# Patient Record
Sex: MALE | URBAN - METROPOLITAN AREA
[De-identification: names, ages, dates, MRNs, and addresses within clinical notes are randomized per-mention and may not be internally consistent; named-entity substitution may affect disease eponyms.]

---

## 2024-07-16 ENCOUNTER — NURSE TRIAGE (OUTPATIENT)
Dept: NURSING | Facility: CLINIC | Age: 19
End: 2024-07-16

## 2024-07-16 NOTE — TELEPHONE ENCOUNTER
Pt calling that he is interested in coming in for a STI/STD test.  Pt also states today he had dental appt and had xrays put into his mouth without plastic wrap.  Pt states now he has noticed a lot of bumps now on the tip of his tongue.  No swelling, no difficulty to swallow.  Pt intends to have tongue looked at tomorrow when [facility] is open.  Yomaira Quezada RN  Emergency Department Results Team    Reason for Disposition   All other mouth symptoms (Exceptions: dry mouth from not drinking enough liquids, chapped lips)    Additional Information   Negative: SEVERE difficulty breathing (e.g., struggling for each breath, speaks in single words, stridor)   Negative: Sounds like a life-threatening emergency to the triager   Negative: Injury to tooth or teeth   Negative: Injury to mouth   Negative: Canker sore suspected (i.e., aphthous ulcer) in the mouth   Negative: Cold sore suspected (i.e., fever blister sore on the outer lip)   Negative: Tooth is painful or swelling around a tooth   Negative: Mouth is painful   Negative: Throat is painful   Negative: Tongue swelling   Negative: Lip swelling   Negative: [1] Drooling or spitting out saliva (because can't swallow) AND [2] new-onset   Negative: [1] Bleeding from mouth AND [2] won't stop after 10 minutes of direct pressure   Negative: Electrical burn of mouth   Negative: Chemical burn of mouth   Negative: [1] Difficulty breathing AND [2] not severe   Negative: Patient sounds very sick or weak to the triager   Negative: [1] Bloody crusts on lips or sores in mouth AND [2] rash anywhere elese on body (back, chest, face, palms, soles)   Negative: [1] Gum bleeding AND [2] taking Coumadin (warfarin) or other strong blood thinner, or known bleeding disorder (e.g., thrombocytopenia)   Negative: [1] Dry mouth AND [2] urinating more frequently than usual (i.e., frequency)   Negative: [1] Dry mouth AND [2] drinking more liquids than usual (thirsty) AND [3] > 1 day (24  hours)   Negative: Receiving chemotherapy or radiation therapy   Negative: [1] White patches that stick to tongue or inner cheek AND [2] can be wiped off   Negative: [1] Dry mouth AND [2] new-onset AND [3] unexplained (Exceptions: chronic symptom or dry mouth from mild dehydration)   Negative: Weak immune system (e.g., HIV positive, cancer chemo, splenectomy, organ transplant, chronic steroids)   Negative: Dentures rub and cause pain   Negative: Painless lump in mouth   Negative: Red patch in mouth or on tongue   Negative: White patch in mouth or on tongue   Negative: Dry mouth is a chronic symptom (recurrent or ongoing AND present > 4 weeks)   Negative: Bleeding from mouth is a chronic symptom (recurrent or ongoing AND present > 4 weeks)   Negative: Cracked skin at corners of mouth (i.e., where lips come together)   Negative: [1] Chapped lips AND [2] no improvement using Care Advice    Protocols used: Mouth Symptoms-A-AH